# Patient Record
Sex: MALE | Race: AMERICAN INDIAN OR ALASKA NATIVE
[De-identification: names, ages, dates, MRNs, and addresses within clinical notes are randomized per-mention and may not be internally consistent; named-entity substitution may affect disease eponyms.]

---

## 2019-11-08 ENCOUNTER — HOSPITAL ENCOUNTER (EMERGENCY)
Dept: HOSPITAL 5 - ED | Age: 64
Discharge: HOME | End: 2019-11-08
Payer: COMMERCIAL

## 2019-11-08 VITALS — DIASTOLIC BLOOD PRESSURE: 93 MMHG | SYSTOLIC BLOOD PRESSURE: 152 MMHG

## 2019-11-08 DIAGNOSIS — Z87.442: ICD-10-CM
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
## 2019-11-08 DIAGNOSIS — I50.9: ICD-10-CM

## 2019-11-08 DIAGNOSIS — S00.03XA: Primary | ICD-10-CM

## 2019-11-08 DIAGNOSIS — Z88.2: ICD-10-CM
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
## 2019-11-08 DIAGNOSIS — Z88.0: ICD-10-CM

## 2019-11-08 PROCEDURE — 70450 CT HEAD/BRAIN W/O DYE: CPT

## 2019-11-08 PROCEDURE — 72125 CT NECK SPINE W/O DYE: CPT

## 2019-11-08 NOTE — CAT SCAN REPORT
CT head/brain wo con



INDICATION / CLINICAL INFORMATION:

64 years Male; trauma.



TECHNIQUE: Routine CT head without contrast. All CT scans at this location are performed using CT dos
e reduction for ALARA by means of automated exposure control.



COMPARISON: 

None.



FINDINGS:



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 Small, pres
umably old branch PICA infarcts seen bilaterally as well. Small lacunar-type infarcts seen in the kortney
gliocapsular regions. It might be difficult to determine the age of some of these findings without di
ffusion imaging by MRI.



 Otherwise, no acute hemorrhage, mass effect, midline shift, hydrocephalus, or acute, large territori
al infarct. No chronic infarct or atrophy appreciated. No significant white matter abnormality.



CRANIOCERVICAL JUNCTION: No significant abnormality.



ORBITS: No significant abnormality of visualized orbits.

SINUSES / MASTOIDS: No significant abnormality the visualized paranasal sinuses or mastoid air cells.




ADDITIONAL FINDINGS: None. 



IMPRESSION:

1. No focal mass, hemorrhage, hydrocephalus, or acute, large territorial infarct. Follow-up with diff
usion imaging by MRI, as clinically warranted.



Signer Name: Aaron Falcon MD, III 

Signed: 11/8/2019 11:08 AM

 Workstation Name: [email or web address]

## 2019-11-08 NOTE — EMERGENCY DEPARTMENT REPORT
ED Head Trauma HPI





- General


Chief complaint: Head Injury


Stated complaint: RT SIDE HEAD INJURY


Time Seen by Provider: 11/08/19 09:30


Source: patient


Mode of arrival: Ambulatory


Limitations: No Limitations





- History of Present Illness


Initial comments: 





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

disable passengers fell on top of his head just PTA.  


MD Complaint: head injury


Mechanism of Injury: machine or tool related


Location: parietal


Loss of Consciousness: no


Previous Trauma to this Area: No


Place: other (street)


Radiation: none


Severity: mild


Severity scale (0 -10): 3


Quality: aching


Consistency: intermittent


Provoking factors: none known


Other Injuries: none


Associated Symptoms: denies: confusion, amnesia, repetitive questioning, vision 

changes, nausea, vomiting, vertigo, syncope, numbness, weakness, tingling, neck 

pain





- Related Data


Allergies/Adverse reactions: 


                                    Allergies











Allergy/AdvReac Type Severity Reaction Status Date / Time


 


Penicillins Allergy  Anaphylaxis Verified 11/08/19 09:24


 


sulfamethoxazole Allergy  Hives Verified 11/08/19 09:23





[From Bactrim]     


 


trimethoprim [From Bactrim] Allergy  Hives Verified 11/08/19 09:23














ED Review of Systems


ROS: 


Stated complaint: RT SIDE HEAD INJURY


Other details as noted in HPI





Other: 





GENERAL: No weight change, fatigue, fever, chills, or night sweats


SKIN: No changes in skin or hair, no itching, no rashes, no jaundice


HEAD: No trauma 


EYES: No blurriness, tearing, itching, acute visual loss, conjunctival 

discoloration, or scleral icterus


EARS: No hearing loss,  tinnitus, vertigo, or earache


NOSE: No rhinorrhea, stuffiness, sneezing, itching, or epistaxis


MOUTH: No bleeding gums, hoarseness, sore throat, or swelling


CARDIAC: No new murmur, chest pain, palpitations, dyspnea on exertion, 

orthopnea, PND, or edema


RESPIRATORY: No shortness of breath, wheeze, cough, sputum production, 

hemoptysis


GI: No nausea, vomiting,  dysphagia, diarrhea, constipation, hematemesis, 

melena, hematochezia, or abdominal pain


URINARY: No frequency, urgency, polyuria, dysuria, hematuria, or incontinence


MUSCULOSKELETAL: No muscle weakness, joint stiffness, decrease in range of 

motion, redness, swelling


NEUROLOGIC: Headache. No syncope, loss of sensation, numbness, tingling, 

tremors, weakness, paralysis, seizures


HEMATOLOGIC: No anemia, easy bruising, bleeding, petechiae, or purpura


ENDOCRINE: No hot or cold intolerance, sweating, polyuria, polydipsia or, 

polyphagia no thyroid problems


PSYCHIATRIC: No change in mood, no anxiety, no depression





ED Past Medical Hx





- Past Medical History


Additional medical history: CHF





- Surgical History


Additional Surgical History: KIDNEY STONES/ TONSILLS





- Social History


Smoking Status: Never Smoker


Substance Use Type: None





ED Physical Exam





- General


Limitations: No Limitations





- Other


Other exam information: 








GENERAL: Patient in no acute distress


HEAD: Normocephalic, atraumatic


EYES: PERRLA, EOM intact, no scleral icterus, no conjunctival hemorrhage, visual

fields and acuity wnl


NOSE: No tenderness, discharge, sinus tenderness


MOUTH: No erythema, bleeding, exudate


HEART: Regular rate and rhythm, no murmur, S1-S2 are auscultated, no edema, 

pulses are symmetric


LUNGS: No respiratory distress. Bilateral breath sounds, No tachypnea, No 

retractions, No wheezing, rales, rhonchi


ABDOMEN: Normal bowel sounds, abdomen soft, no tenderness, no rebound, no 

guarding, no distention, no masses, no CVA tenderness


MUSCULOSKELETAL: Normal joint range of motion, no redness, no swelling, no 

tenderness


NEUROLOGIC: GCS 15, Alert and Oriented x3, Cranial nerves intact, normal 

sensation, normal strength, no cerebellar deficit, NIHSS 0


SKIN: Skin is warm and dry, no wounds, no rashes





ED Course


                                   Vital Signs











  11/08/19





  09:09


 


Temperature 98.7 F


 


Pulse Rate 70


 


Respiratory 20





Rate 


 


Blood Pressure 152/93


 


O2 Sat by Pulse 99





Oximetry 














- Radiology Data


Radiology results: report reviewed





- Medical Decision Making





Patient comfortable.  Reports symptom improvement.  Updated with results.  


Critical care attestation.: 


If time is entered above; I have spent that time in minutes in the direct care 

of this critically ill patient, excluding procedure time.








ED Disposition


Clinical Impression: 


Head contusion


Qualifiers:


 Encounter type: initial encounter Contusion of head detail: scalp Qualified 

Code(s): S00.03XA - Contusion of scalp, initial encounter





Disposition: DC-01 TO HOME OR SELFCARE


Is pt being admited?: No


Condition: Stable


Instructions:  Minor Head Injury (ED)


Referrals: 


MAGDALENA VILLAREAL MD [Staff Physician] - as needed


Time of Disposition: 11:58

## 2019-11-08 NOTE — CAT SCAN REPORT
CT CERVICAL SPINE: 11/8/2019



INDICATION / CLINICAL INFORMATION:

MAIN: NECK PAIN AFTER A FALL .



COMPARISON: 

None available.



FINDINGS:



 CT images of the cervical spine were obtained. Images are evaluated in the axial, coronal, and sagit
ronn planes.







There is no evidence of acute abnormality.



Degenerative changes are present, associated with reversal of cervical lordosis centered at the C4-5 
level. Disc space narrowing and osteophyte formation is present at several levels, including C4-5, C5
-6, and C6-7.









LEVEL BY LEVEL ANALYSIS:

 .



CRANIOCERVICAL JUNCTION: Unremarkable.



PARASPINAL STRUCTURES: Unremarkable





 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 The right is slightly prominent in size, wi
th no evidence of focal mass. 



IMPRESSION:

 No acute abnormality. Degenerative changes.









All CT scans at this location are performed using dose reduction to ALARA by means of automated expos
ure control.



Signer Name: Jordan Guerin MD 

Signed: 11/8/2019 11:16 AM

 Workstation Name: [id number]